# Patient Record
Sex: MALE | Race: WHITE | Employment: OTHER | URBAN - METROPOLITAN AREA
[De-identification: names, ages, dates, MRNs, and addresses within clinical notes are randomized per-mention and may not be internally consistent; named-entity substitution may affect disease eponyms.]

---

## 2018-01-04 ENCOUNTER — APPOINTMENT (OUTPATIENT)
Dept: ULTRASOUND IMAGING | Age: 78
DRG: 682 | End: 2018-01-04
Payer: MEDICARE

## 2018-01-04 ENCOUNTER — HOSPITAL ENCOUNTER (INPATIENT)
Age: 78
LOS: 1 days | Discharge: HOME | DRG: 682 | End: 2018-01-05
Attending: EMERGENCY MEDICINE | Admitting: INTERNAL MEDICINE
Payer: MEDICARE

## 2018-01-04 DIAGNOSIS — E87.6 HYPOKALEMIA: ICD-10-CM

## 2018-01-04 DIAGNOSIS — N17.9 AKI (ACUTE KIDNEY INJURY) (HCC): ICD-10-CM

## 2018-01-04 DIAGNOSIS — E86.0 DEHYDRATION: Primary | ICD-10-CM

## 2018-01-04 PROBLEM — J10.1 INFLUENZA A: Status: ACTIVE | Noted: 2018-01-04

## 2018-01-04 LAB
-: ABNORMAL
-: ABNORMAL
ABSOLUTE EOS #: 0 K/UL (ref 0–0.4)
ABSOLUTE EOS #: 0 K/UL (ref 0–0.4)
ABSOLUTE IMMATURE GRANULOCYTE: ABNORMAL K/UL (ref 0–0.3)
ABSOLUTE IMMATURE GRANULOCYTE: ABNORMAL K/UL (ref 0–0.3)
ABSOLUTE LYMPH #: 0.5 K/UL (ref 1–4.8)
ABSOLUTE LYMPH #: 0.7 K/UL (ref 1–4.8)
ABSOLUTE MONO #: 0.4 K/UL (ref 0.1–1.3)
ABSOLUTE MONO #: 0.4 K/UL (ref 0.1–1.3)
ALBUMIN SERPL-MCNC: 3.4 G/DL (ref 3.5–5.2)
ALBUMIN SERPL-MCNC: 4.3 G/DL (ref 3.5–5.2)
ALBUMIN/GLOBULIN RATIO: ABNORMAL (ref 1–2.5)
ALBUMIN/GLOBULIN RATIO: NORMAL (ref 1–2.5)
ALP BLD-CCNC: 54 U/L (ref 40–129)
ALP BLD-CCNC: 72 U/L (ref 40–129)
ALT SERPL-CCNC: 19 U/L (ref 5–41)
ALT SERPL-CCNC: 26 U/L (ref 5–41)
AMORPHOUS: ABNORMAL
AMORPHOUS: ABNORMAL
ANION GAP SERPL CALCULATED.3IONS-SCNC: 14 MMOL/L (ref 9–17)
ANION GAP SERPL CALCULATED.3IONS-SCNC: 19 MMOL/L (ref 9–17)
AST SERPL-CCNC: 31 U/L
AST SERPL-CCNC: 39 U/L
BACTERIA: ABNORMAL
BACTERIA: ABNORMAL
BASOPHILS # BLD: 1 % (ref 0–2)
BASOPHILS # BLD: 1 % (ref 0–2)
BASOPHILS ABSOLUTE: 0 K/UL (ref 0–0.2)
BASOPHILS ABSOLUTE: 0 K/UL (ref 0–0.2)
BILIRUB SERPL-MCNC: 0.38 MG/DL (ref 0.3–1.2)
BILIRUB SERPL-MCNC: 0.56 MG/DL (ref 0.3–1.2)
BILIRUBIN DIRECT: 0.14 MG/DL
BILIRUBIN URINE: NEGATIVE
BILIRUBIN URINE: NEGATIVE
BILIRUBIN, INDIRECT: 0.42 MG/DL (ref 0–1)
BUN BLDV-MCNC: 26 MG/DL (ref 8–23)
BUN BLDV-MCNC: 29 MG/DL (ref 8–23)
BUN/CREAT BLD: ABNORMAL (ref 9–20)
BUN/CREAT BLD: ABNORMAL (ref 9–20)
CALCIUM SERPL-MCNC: 8.1 MG/DL (ref 8.6–10.4)
CALCIUM SERPL-MCNC: 8.8 MG/DL (ref 8.6–10.4)
CASTS UA: ABNORMAL /LPF
CASTS UA: ABNORMAL /LPF
CHLORIDE BLD-SCNC: 104 MMOL/L (ref 98–107)
CHLORIDE BLD-SCNC: 97 MMOL/L (ref 98–107)
CO2: 23 MMOL/L (ref 20–31)
CO2: 23 MMOL/L (ref 20–31)
COLOR: YELLOW
COLOR: YELLOW
COMMENT UA: ABNORMAL
COMMENT UA: ABNORMAL
CREAT SERPL-MCNC: 1.6 MG/DL (ref 0.7–1.2)
CREAT SERPL-MCNC: 1.81 MG/DL (ref 0.7–1.2)
CRYSTALS, UA: ABNORMAL /HPF
CRYSTALS, UA: ABNORMAL /HPF
DIFFERENTIAL TYPE: ABNORMAL
DIFFERENTIAL TYPE: ABNORMAL
DIRECT EXAM: ABNORMAL
DIRECT EXAM: ABNORMAL
EOSINOPHILS RELATIVE PERCENT: 0 % (ref 0–4)
EOSINOPHILS RELATIVE PERCENT: 0 % (ref 0–4)
EPITHELIAL CELLS UA: ABNORMAL /HPF
EPITHELIAL CELLS UA: ABNORMAL /HPF
GFR AFRICAN AMERICAN: 44 ML/MIN
GFR AFRICAN AMERICAN: 51 ML/MIN
GFR NON-AFRICAN AMERICAN: 37 ML/MIN
GFR NON-AFRICAN AMERICAN: 42 ML/MIN
GFR SERPL CREATININE-BSD FRML MDRD: ABNORMAL ML/MIN/{1.73_M2}
GLOBULIN: NORMAL G/DL (ref 1.5–3.8)
GLUCOSE BLD-MCNC: 127 MG/DL (ref 75–110)
GLUCOSE BLD-MCNC: 134 MG/DL (ref 75–110)
GLUCOSE BLD-MCNC: 140 MG/DL (ref 70–99)
GLUCOSE BLD-MCNC: 160 MG/DL (ref 75–110)
GLUCOSE BLD-MCNC: 167 MG/DL (ref 75–110)
GLUCOSE BLD-MCNC: 236 MG/DL (ref 75–110)
GLUCOSE BLD-MCNC: 62 MG/DL (ref 75–110)
GLUCOSE BLD-MCNC: 66 MG/DL (ref 70–99)
GLUCOSE URINE: ABNORMAL
GLUCOSE URINE: NEGATIVE
HCT VFR BLD CALC: 37.8 % (ref 41–53)
HCT VFR BLD CALC: 44.3 % (ref 41–53)
HEMOGLOBIN: 12.7 G/DL (ref 13.5–17.5)
HEMOGLOBIN: 15 G/DL (ref 13.5–17.5)
IMMATURE GRANULOCYTES: ABNORMAL %
IMMATURE GRANULOCYTES: ABNORMAL %
INR BLD: 1.1
KETONES, URINE: NEGATIVE
KETONES, URINE: NEGATIVE
LEUKOCYTE ESTERASE, URINE: NEGATIVE
LEUKOCYTE ESTERASE, URINE: NEGATIVE
LIPASE: 70 U/L (ref 13–60)
LYMPHOCYTES # BLD: 11 % (ref 24–44)
LYMPHOCYTES # BLD: 23 % (ref 24–44)
Lab: ABNORMAL
MAGNESIUM: 1.6 MG/DL (ref 1.6–2.6)
MCH RBC QN AUTO: 30.8 PG (ref 26–34)
MCH RBC QN AUTO: 31 PG (ref 26–34)
MCHC RBC AUTO-ENTMCNC: 33.6 G/DL (ref 31–37)
MCHC RBC AUTO-ENTMCNC: 33.8 G/DL (ref 31–37)
MCV RBC AUTO: 91.5 FL (ref 80–100)
MCV RBC AUTO: 91.8 FL (ref 80–100)
MONOCYTES # BLD: 10 % (ref 1–7)
MONOCYTES # BLD: 11 % (ref 1–7)
MUCUS: ABNORMAL
MUCUS: ABNORMAL
NITRITE, URINE: NEGATIVE
NITRITE, URINE: NEGATIVE
OSMOLALITY URINE: 460 MOSM/KG (ref 80–1300)
OTHER OBSERVATIONS UA: ABNORMAL
OTHER OBSERVATIONS UA: ABNORMAL
PDW BLD-RTO: 12.8 % (ref 11.5–14.9)
PDW BLD-RTO: 12.8 % (ref 11.5–14.9)
PH UA: 5.5 (ref 5–8)
PH UA: 5.5 (ref 5–8)
PLATELET # BLD: 126 K/UL (ref 150–450)
PLATELET # BLD: 131 K/UL (ref 150–450)
PLATELET ESTIMATE: ABNORMAL
PLATELET ESTIMATE: ABNORMAL
PMV BLD AUTO: 8.1 FL (ref 6–12)
PMV BLD AUTO: 8.4 FL (ref 6–12)
POTASSIUM SERPL-SCNC: 3.3 MMOL/L (ref 3.7–5.3)
POTASSIUM SERPL-SCNC: 3.4 MMOL/L (ref 3.7–5.3)
PROTEIN UA: ABNORMAL
PROTEIN UA: NEGATIVE
PROTHROMBIN TIME: 11.4 SEC (ref 9.7–12)
RBC # BLD: 4.14 M/UL (ref 4.5–5.9)
RBC # BLD: 4.83 M/UL (ref 4.5–5.9)
RBC # BLD: ABNORMAL 10*6/UL
RBC # BLD: ABNORMAL 10*6/UL
RBC UA: ABNORMAL /HPF
RBC UA: ABNORMAL /HPF
RENAL EPITHELIAL, UA: ABNORMAL /HPF
RENAL EPITHELIAL, UA: ABNORMAL /HPF
SEG NEUTROPHILS: 65 % (ref 36–66)
SEG NEUTROPHILS: 78 % (ref 36–66)
SEGMENTED NEUTROPHILS ABSOLUTE COUNT: 2.1 K/UL (ref 1.3–9.1)
SEGMENTED NEUTROPHILS ABSOLUTE COUNT: 3.3 K/UL (ref 1.3–9.1)
SERUM OSMOLALITY: 293 MOSM/KG (ref 275–295)
SODIUM BLD-SCNC: 139 MMOL/L (ref 135–144)
SODIUM BLD-SCNC: 141 MMOL/L (ref 135–144)
SODIUM,UR: 95 MMOL/L
SPECIFIC GRAVITY UA: 1.01 (ref 1–1.03)
SPECIFIC GRAVITY UA: 1.02 (ref 1–1.03)
SPECIMEN DESCRIPTION: ABNORMAL
SPECIMEN DESCRIPTION: ABNORMAL
STATUS: ABNORMAL
TOTAL PROTEIN, URINE: 13 MG/DL
TOTAL PROTEIN: 6.1 G/DL (ref 6.4–8.3)
TOTAL PROTEIN: 7.9 G/DL (ref 6.4–8.3)
TRICHOMONAS: ABNORMAL
TRICHOMONAS: ABNORMAL
TURBIDITY: CLEAR
TURBIDITY: CLEAR
URINE HGB: ABNORMAL
URINE HGB: NEGATIVE
UROBILINOGEN, URINE: NORMAL
UROBILINOGEN, URINE: NORMAL
WBC # BLD: 3.2 K/UL (ref 3.5–11)
WBC # BLD: 4.3 K/UL (ref 3.5–11)
WBC # BLD: ABNORMAL 10*3/UL
WBC # BLD: ABNORMAL 10*3/UL
WBC UA: ABNORMAL /HPF
WBC UA: ABNORMAL /HPF
YEAST: ABNORMAL
YEAST: ABNORMAL

## 2018-01-04 PROCEDURE — 80076 HEPATIC FUNCTION PANEL: CPT

## 2018-01-04 PROCEDURE — 6370000000 HC RX 637 (ALT 250 FOR IP): Performed by: NURSE PRACTITIONER

## 2018-01-04 PROCEDURE — 2580000003 HC RX 258: Performed by: NURSE PRACTITIONER

## 2018-01-04 PROCEDURE — 81001 URINALYSIS AUTO W/SCOPE: CPT

## 2018-01-04 PROCEDURE — 83690 ASSAY OF LIPASE: CPT

## 2018-01-04 PROCEDURE — 1200000000 HC SEMI PRIVATE

## 2018-01-04 PROCEDURE — 85025 COMPLETE CBC W/AUTO DIFF WBC: CPT

## 2018-01-04 PROCEDURE — 87804 INFLUENZA ASSAY W/OPTIC: CPT

## 2018-01-04 PROCEDURE — 76770 US EXAM ABDO BACK WALL COMP: CPT

## 2018-01-04 PROCEDURE — 36415 COLL VENOUS BLD VENIPUNCTURE: CPT

## 2018-01-04 PROCEDURE — 80053 COMPREHEN METABOLIC PANEL: CPT

## 2018-01-04 PROCEDURE — 82947 ASSAY GLUCOSE BLOOD QUANT: CPT

## 2018-01-04 PROCEDURE — 83935 ASSAY OF URINE OSMOLALITY: CPT

## 2018-01-04 PROCEDURE — 85610 PROTHROMBIN TIME: CPT

## 2018-01-04 PROCEDURE — 99285 EMERGENCY DEPT VISIT HI MDM: CPT

## 2018-01-04 PROCEDURE — 83735 ASSAY OF MAGNESIUM: CPT

## 2018-01-04 PROCEDURE — 84300 ASSAY OF URINE SODIUM: CPT

## 2018-01-04 PROCEDURE — 99223 1ST HOSP IP/OBS HIGH 75: CPT | Performed by: INTERNAL MEDICINE

## 2018-01-04 PROCEDURE — 84156 ASSAY OF PROTEIN URINE: CPT

## 2018-01-04 PROCEDURE — 83930 ASSAY OF BLOOD OSMOLALITY: CPT

## 2018-01-04 PROCEDURE — 80048 BASIC METABOLIC PNL TOTAL CA: CPT

## 2018-01-04 PROCEDURE — 6370000000 HC RX 637 (ALT 250 FOR IP): Performed by: INTERNAL MEDICINE

## 2018-01-04 RX ORDER — METOPROLOL SUCCINATE 50 MG/1
50 TABLET, EXTENDED RELEASE ORAL DAILY
Status: DISCONTINUED | OUTPATIENT
Start: 2018-01-04 | End: 2018-01-05 | Stop reason: HOSPADM

## 2018-01-04 RX ORDER — GLIMEPIRIDE 4 MG/1
4 TABLET ORAL 2 TIMES DAILY
COMMUNITY

## 2018-01-04 RX ORDER — OSELTAMIVIR PHOSPHATE 6 MG/ML
75 FOR SUSPENSION ORAL 2 TIMES DAILY
Status: DISCONTINUED | OUTPATIENT
Start: 2018-01-04 | End: 2018-01-04

## 2018-01-04 RX ORDER — TRAMADOL HYDROCHLORIDE 50 MG/1
100 TABLET ORAL EVERY 6 HOURS PRN
Status: DISCONTINUED | OUTPATIENT
Start: 2018-01-04 | End: 2018-01-05 | Stop reason: HOSPADM

## 2018-01-04 RX ORDER — ASPIRIN 81 MG/1
81 TABLET, CHEWABLE ORAL DAILY
COMMUNITY

## 2018-01-04 RX ORDER — OSELTAMIVIR PHOSPHATE 6 MG/ML
30 FOR SUSPENSION ORAL 2 TIMES DAILY
Status: DISCONTINUED | OUTPATIENT
Start: 2018-01-04 | End: 2018-01-05 | Stop reason: HOSPADM

## 2018-01-04 RX ORDER — DEXTROSE MONOHYDRATE 25 G/50ML
12.5 INJECTION, SOLUTION INTRAVENOUS PRN
Status: DISCONTINUED | OUTPATIENT
Start: 2018-01-04 | End: 2018-01-05 | Stop reason: HOSPADM

## 2018-01-04 RX ORDER — OSELTAMIVIR PHOSPHATE 75 MG/1
75 CAPSULE ORAL 2 TIMES DAILY
Status: DISCONTINUED | OUTPATIENT
Start: 2018-01-04 | End: 2018-01-04 | Stop reason: DRUGHIGH

## 2018-01-04 RX ORDER — ATORVASTATIN CALCIUM 80 MG/1
80 TABLET, FILM COATED ORAL DAILY
COMMUNITY

## 2018-01-04 RX ORDER — POTASSIUM CHLORIDE 7.45 MG/ML
10 INJECTION INTRAVENOUS PRN
Status: DISCONTINUED | OUTPATIENT
Start: 2018-01-04 | End: 2018-01-05 | Stop reason: HOSPADM

## 2018-01-04 RX ORDER — ATORVASTATIN CALCIUM 80 MG/1
80 TABLET, FILM COATED ORAL DAILY
Status: DISCONTINUED | OUTPATIENT
Start: 2018-01-04 | End: 2018-01-05 | Stop reason: HOSPADM

## 2018-01-04 RX ORDER — LOSARTAN POTASSIUM 100 MG/1
100 TABLET ORAL DAILY
COMMUNITY

## 2018-01-04 RX ORDER — SODIUM CHLORIDE 9 MG/ML
INJECTION, SOLUTION INTRAVENOUS CONTINUOUS
Status: DISCONTINUED | OUTPATIENT
Start: 2018-01-04 | End: 2018-01-05 | Stop reason: HOSPADM

## 2018-01-04 RX ORDER — DOCUSATE SODIUM 100 MG/1
100 CAPSULE, LIQUID FILLED ORAL 2 TIMES DAILY
Status: DISCONTINUED | OUTPATIENT
Start: 2018-01-04 | End: 2018-01-05 | Stop reason: HOSPADM

## 2018-01-04 RX ORDER — NICOTINE 21 MG/24HR
1 PATCH, TRANSDERMAL 24 HOURS TRANSDERMAL DAILY PRN
Status: DISCONTINUED | OUTPATIENT
Start: 2018-01-04 | End: 2018-01-05 | Stop reason: HOSPADM

## 2018-01-04 RX ORDER — HYDROCHLOROTHIAZIDE 12.5 MG/1
12.5 CAPSULE, GELATIN COATED ORAL DAILY
COMMUNITY

## 2018-01-04 RX ORDER — POTASSIUM CHLORIDE 20 MEQ/1
40 TABLET, EXTENDED RELEASE ORAL PRN
Status: DISCONTINUED | OUTPATIENT
Start: 2018-01-04 | End: 2018-01-05 | Stop reason: HOSPADM

## 2018-01-04 RX ORDER — SODIUM CHLORIDE 0.9 % (FLUSH) 0.9 %
10 SYRINGE (ML) INJECTION PRN
Status: DISCONTINUED | OUTPATIENT
Start: 2018-01-04 | End: 2018-01-05 | Stop reason: HOSPADM

## 2018-01-04 RX ORDER — DEXTROSE MONOHYDRATE 50 MG/ML
100 INJECTION, SOLUTION INTRAVENOUS PRN
Status: DISCONTINUED | OUTPATIENT
Start: 2018-01-04 | End: 2018-01-05 | Stop reason: HOSPADM

## 2018-01-04 RX ORDER — METOPROLOL SUCCINATE 50 MG/1
50 TABLET, EXTENDED RELEASE ORAL DAILY
COMMUNITY

## 2018-01-04 RX ORDER — SODIUM CHLORIDE 0.9 % (FLUSH) 0.9 %
10 SYRINGE (ML) INJECTION EVERY 12 HOURS SCHEDULED
Status: DISCONTINUED | OUTPATIENT
Start: 2018-01-04 | End: 2018-01-05 | Stop reason: HOSPADM

## 2018-01-04 RX ORDER — ALOGLIPTIN BENZOATE AND PIOGLITAZONE HYDROCHLORIDE 25; 30 MG/1; MG/1
1 TABLET, FILM COATED ORAL DAILY
COMMUNITY

## 2018-01-04 RX ORDER — ACETAMINOPHEN 325 MG/1
650 TABLET ORAL EVERY 4 HOURS PRN
Status: DISCONTINUED | OUTPATIENT
Start: 2018-01-04 | End: 2018-01-05 | Stop reason: HOSPADM

## 2018-01-04 RX ORDER — ONDANSETRON 2 MG/ML
4 INJECTION INTRAMUSCULAR; INTRAVENOUS EVERY 6 HOURS PRN
Status: DISCONTINUED | OUTPATIENT
Start: 2018-01-04 | End: 2018-01-05 | Stop reason: HOSPADM

## 2018-01-04 RX ORDER — POTASSIUM CHLORIDE 20MEQ/15ML
40 LIQUID (ML) ORAL PRN
Status: DISCONTINUED | OUTPATIENT
Start: 2018-01-04 | End: 2018-01-05 | Stop reason: HOSPADM

## 2018-01-04 RX ORDER — TRAMADOL HYDROCHLORIDE 50 MG/1
50 TABLET ORAL EVERY 6 HOURS PRN
Status: DISCONTINUED | OUTPATIENT
Start: 2018-01-04 | End: 2018-01-05 | Stop reason: HOSPADM

## 2018-01-04 RX ORDER — DILTIAZEM HYDROCHLORIDE 180 MG/1
180 CAPSULE, EXTENDED RELEASE ORAL DAILY
COMMUNITY

## 2018-01-04 RX ORDER — DILTIAZEM HYDROCHLORIDE 180 MG/1
180 CAPSULE, COATED, EXTENDED RELEASE ORAL DAILY
Status: DISCONTINUED | OUTPATIENT
Start: 2018-01-04 | End: 2018-01-05 | Stop reason: HOSPADM

## 2018-01-04 RX ORDER — NICOTINE POLACRILEX 4 MG
15 LOZENGE BUCCAL PRN
Status: DISCONTINUED | OUTPATIENT
Start: 2018-01-04 | End: 2018-01-05 | Stop reason: HOSPADM

## 2018-01-04 RX ORDER — ASPIRIN 81 MG/1
81 TABLET, CHEWABLE ORAL DAILY
Status: DISCONTINUED | OUTPATIENT
Start: 2018-01-04 | End: 2018-01-05 | Stop reason: HOSPADM

## 2018-01-04 RX ADMIN — Medication 10 ML: at 09:47

## 2018-01-04 RX ADMIN — APIXABAN 5 MG: 5 TABLET, FILM COATED ORAL at 09:48

## 2018-01-04 RX ADMIN — POTASSIUM CHLORIDE 40 MEQ: 20 TABLET, EXTENDED RELEASE ORAL at 09:48

## 2018-01-04 RX ADMIN — ATORVASTATIN CALCIUM 80 MG: 80 TABLET, FILM COATED ORAL at 09:47

## 2018-01-04 RX ADMIN — SODIUM CHLORIDE: 9 INJECTION, SOLUTION INTRAVENOUS at 03:49

## 2018-01-04 RX ADMIN — OSELTAMIVIR PHOSPHATE 30 MG: 6 POWDER, FOR SUSPENSION ORAL at 14:36

## 2018-01-04 RX ADMIN — METOPROLOL SUCCINATE 50 MG: 50 TABLET, EXTENDED RELEASE ORAL at 09:48

## 2018-01-04 RX ADMIN — OSELTAMIVIR PHOSPHATE 30 MG: 6 POWDER, FOR SUSPENSION ORAL at 22:51

## 2018-01-04 RX ADMIN — APIXABAN 5 MG: 5 TABLET, FILM COATED ORAL at 22:33

## 2018-01-04 RX ADMIN — DILTIAZEM HYDROCHLORIDE 180 MG: 180 CAPSULE, COATED, EXTENDED RELEASE ORAL at 09:48

## 2018-01-04 RX ADMIN — ASPIRIN 81 MG 81 MG: 81 TABLET ORAL at 09:47

## 2018-01-04 RX ADMIN — ACETAMINOPHEN 650 MG: 325 TABLET, FILM COATED ORAL at 06:25

## 2018-01-04 RX ADMIN — DOCUSATE SODIUM 100 MG: 100 CAPSULE, LIQUID FILLED ORAL at 09:48

## 2018-01-04 ASSESSMENT — ENCOUNTER SYMPTOMS
EYE DISCHARGE: 0
EYE REDNESS: 0
COLOR CHANGE: 0
SHORTNESS OF BREATH: 0
RHINORRHEA: 0
COUGH: 0
SORE THROAT: 0
NAUSEA: 1
VOMITING: 0
DIARRHEA: 0

## 2018-01-04 ASSESSMENT — PAIN SCALES - GENERAL
PAINLEVEL_OUTOF10: 0

## 2018-01-04 NOTE — H&P
[address] [facility].    HISTORY AND PHYSICAL EXAMINATION            Date:   1/4/2018  Patient name:  Paco Harrison  Date of admission:  1/4/2018 12:13 AM  MRN:   [id number]  YOB: 1940    CHIEF COMPLAINT:   Unwell  History Obtained From:  Patient and chart review. HISTORY OF PRESENT ILLNESS:      The patient is a 69 y.o.  male who is admitted to the hospital for  Patient admitted with a few day history of cough and runny nose fever and body aches not taking oral diet admitted with a very low sugar of 24 acute renal failure and influenza A positive    Past Medical History:   has a past medical history of Diabetes mellitus ([facility] [address] Hyperlipidemia; Hypertension; and Pacemaker. Past Surgical History:   has a past surgical history that includes Pacemaker insertion (02/2017); Cardiac catheterization; and Cataract removal.     Home Medications:    Prior to Admission medications    Medication Sig Start Date End Date Taking?  Authorizing Provider   aspirin 81 MG chewable tablet Take 81 mg by mouth daily   Yes Historical Provider, MD   losartan (COZAAR) 100 MG tablet Take 100 mg by mouth daily   Yes Historical Provider, MD   metFORMIN (GLUCOPHAGE) 1000 MG tablet Take 1,000 mg by mouth 2 times daily (with meals)   Yes Historical Provider, MD   glimepiride (AMARYL) 4 MG tablet Take 4 mg by mouth 2 times daily   Yes Historical Provider, MD   diltiazem (DILACOR XR) 180 MG extended release capsule Take 180 mg by mouth daily   Yes Historical Provider, MD   metoprolol succinate (TOPROL XL) 50 MG extended release tablet Take 50 mg by mouth daily   Yes Historical Provider, MD   atorvastatin (LIPITOR) 80 MG tablet Take 80 mg by mouth daily   Yes Historical Provider, MD   alogliptin-pioglitazone (OSENI) 25-30 MG TABS Take 1 tablet by mouth daily   Yes Historical Provider, MD   apixaban (ELIQUIS) 5 MG TABS tablet Take by mouth 2 times daily   Yes Imaging/Diagonstics:      CXR: No acute cardiopulmonary findings. ASSESSMENT:    Patient Active Problem List   Diagnosis    Acute kidney injury ([facility] [address]    Hypertension    Hyperlipidemia    Diabetes mellitus ([facility] [address]    Influenza A       PLAN:  Influenza pneumonia  Acute renal failure  Hypoglycemia to 24  Discontinue metformin and glimepiride  IV fluids for acute renal failure creatinine is 1.8  Start Tamiflu and oral antibiotics for secondary bacterial infection      MD PRAVEEN Hernandez [address], [address] [place] [address].    Phone [phone]   Fax: [phone]  Answering Service: [phone]

## 2018-01-04 NOTE — ED NOTES
Pt presents in ED c/o hypoglycemia and N/V x2 days. Pt was hard to arouse when family called EMS. EMS took blood glucose and it was 27. [address] gave an amp of D50 and blood glucose increased to 187. Upon arrival to ED blood glucose was 136. Pt reported he has been able to keep down gatorade and bananas. Pt reported diarrhea x1 day. Pt is eupneic, A&OX4, and PWD. Call light in reach.           Helayne Collet, RN  01/04/18 2097

## 2018-01-04 NOTE — ED PROVIDER NOTES
AM  The patient is from out of town, he has no knowledge of any kidney dysfunction. At this time we will discuss with inpatient providers for reevaluation, likely related to dehydration. Sangeeta Arzate CRITICAL CARE:      CONSULTS:  None      PROCEDURES:      FINAL IMPRESSION      1. Dehydration    2. Hypokalemia    3. CHRISTIN (acute kidney injury) [facility])          DISPOSITION/PLAN   DISPOSITION Decision To Admit 01/04/2018 01:01:26 AM          PATIENT REFERRED TO:  No follow-up provider specified.     DISCHARGE MEDICATIONS:  New Prescriptions    No medications on file       (Please note that portions of this note were completed with a voice recognition program.  Efforts were made to edit the dictations but occasionally words are mis-transcribed.)    Dior Ferguson  Emergency Medicine                 Dior Ferguson MD  01/04/18 5252

## 2018-01-04 NOTE — PROGRESS NOTES
ADMISSION NOTE       Patient admitted to room  2049. Time of admit:  0300    Admit from:  ER    Reason for admission:  Acute Kidney Injury    Where patient has been residing for the last 24 hrs:  Niece house visiting from [place]. Has the patient been admitted to any facility in the last 4 weeks, which one:  no    Family at bedside:  yes    Patient is currently in pain no  Patient has been oriented to room, educated on how to use call light, and to call for assistance prior to getting up. Bed in lowest and locked position. 2 siderails up for safety. Call light within reach. Patient in no distress. [facility]  DVT Prophylaxis and Vaccine Status  Work List  Mandatory for all patients      Patient must be on both Chemical prophylaxis and Mechanical prophylaxis.  If chemical/mechanical prophylaxis is not ordered, the physician must document a reason for not using prophylaxis     Chemical Prophylaxis  Is patient on chemical prophylaxis: Yes  If no chemical prophylaxis Is a order in for No Chemical VTE prophylaxis not applicable  If no was the physician notified not applicable      Mechanical Prophylaxis  Is patient on mechanical prophylaxis, intermittent pneumatic compression device: Yes  If no was the physician notified not applicable        Pneumonia Vaccine  Vaccine indicated:  Vaccination indicated, Patient was educated on the benefits of the vaccination and Vaccination refused  If indicated was the vaccine given: no- patient refused    Influenza Vaccine (applicable from October through March):  Vaccine indicated: Vaccination indicated, Patient was educated on the benefits of the vaccination and Vaccination refused  If indicated was the vaccine given: no- patient refused    Patient Education  Education completed on DVT prophylaxis: yes

## 2018-01-04 NOTE — ED NOTES
Provided pt with meal tray with physician's approval. Pt eats independently. Pt tolerated well.         Sarah Milligna RN  01/04/18

## 2018-01-05 VITALS
BODY MASS INDEX: 32.82 KG/M2 | SYSTOLIC BLOOD PRESSURE: 136 MMHG | TEMPERATURE: 98.1 F | OXYGEN SATURATION: 95 % | HEIGHT: 74 IN | HEART RATE: 62 BPM | RESPIRATION RATE: 18 BRPM | DIASTOLIC BLOOD PRESSURE: 65 MMHG | WEIGHT: 255.73 LBS

## 2018-01-05 LAB
ABSOLUTE EOS #: 0 K/UL (ref 0–0.4)
ABSOLUTE IMMATURE GRANULOCYTE: ABNORMAL K/UL (ref 0–0.3)
ABSOLUTE LYMPH #: 1.08 K/UL (ref 1–4.8)
ABSOLUTE MONO #: 0.18 K/UL (ref 0.1–1.3)
ALBUMIN SERPL-MCNC: 3 G/DL (ref 3.5–5.2)
ALBUMIN/GLOBULIN RATIO: ABNORMAL (ref 1–2.5)
ALP BLD-CCNC: 55 U/L (ref 40–129)
ALT SERPL-CCNC: 18 U/L (ref 5–41)
ANION GAP SERPL CALCULATED.3IONS-SCNC: 14 MMOL/L (ref 9–17)
AST SERPL-CCNC: 32 U/L
BASOPHILS # BLD: 0 % (ref 0–2)
BASOPHILS ABSOLUTE: 0 K/UL (ref 0–0.2)
BILIRUB SERPL-MCNC: 0.42 MG/DL (ref 0.3–1.2)
BUN BLDV-MCNC: 23 MG/DL (ref 8–23)
BUN/CREAT BLD: ABNORMAL (ref 9–20)
CALCIUM SERPL-MCNC: 8 MG/DL (ref 8.6–10.4)
CHLORIDE BLD-SCNC: 104 MMOL/L (ref 98–107)
CO2: 20 MMOL/L (ref 20–31)
CREAT SERPL-MCNC: 1.3 MG/DL (ref 0.7–1.2)
DIFFERENTIAL TYPE: ABNORMAL
EOSINOPHILS RELATIVE PERCENT: 0 % (ref 0–4)
GFR AFRICAN AMERICAN: >60 ML/MIN
GFR NON-AFRICAN AMERICAN: 54 ML/MIN
GFR SERPL CREATININE-BSD FRML MDRD: ABNORMAL ML/MIN/{1.73_M2}
GFR SERPL CREATININE-BSD FRML MDRD: ABNORMAL ML/MIN/{1.73_M2}
GLUCOSE BLD-MCNC: 112 MG/DL (ref 75–110)
GLUCOSE BLD-MCNC: 130 MG/DL (ref 70–99)
GLUCOSE BLD-MCNC: 271 MG/DL (ref 75–110)
HCT VFR BLD CALC: 36.1 % (ref 41–53)
HEMOGLOBIN: 11.9 G/DL (ref 13.5–17.5)
IMMATURE GRANULOCYTES: ABNORMAL %
LYMPHOCYTES # BLD: 36 % (ref 24–44)
MCH RBC QN AUTO: 30.5 PG (ref 26–34)
MCHC RBC AUTO-ENTMCNC: 33.1 G/DL (ref 31–37)
MCV RBC AUTO: 92.1 FL (ref 80–100)
MONOCYTES # BLD: 6 % (ref 1–7)
MORPHOLOGY: NORMAL
PDW BLD-RTO: 13.2 % (ref 11.5–14.9)
PLATELET # BLD: 116 K/UL (ref 150–450)
PLATELET ESTIMATE: ABNORMAL
PMV BLD AUTO: 8.3 FL (ref 6–12)
POTASSIUM SERPL-SCNC: 3.6 MMOL/L (ref 3.7–5.3)
RBC # BLD: 3.92 M/UL (ref 4.5–5.9)
RBC # BLD: ABNORMAL 10*6/UL
SEG NEUTROPHILS: 58 % (ref 36–66)
SEGMENTED NEUTROPHILS ABSOLUTE COUNT: 1.74 K/UL (ref 1.3–9.1)
SODIUM BLD-SCNC: 138 MMOL/L (ref 135–144)
TOTAL PROTEIN: 5.9 G/DL (ref 6.4–8.3)
WBC # BLD: 3 K/UL (ref 3.5–11)
WBC # BLD: ABNORMAL 10*3/UL

## 2018-01-05 PROCEDURE — 6370000000 HC RX 637 (ALT 250 FOR IP): Performed by: INTERNAL MEDICINE

## 2018-01-05 PROCEDURE — 6370000000 HC RX 637 (ALT 250 FOR IP): Performed by: NURSE PRACTITIONER

## 2018-01-05 PROCEDURE — 2580000003 HC RX 258: Performed by: NURSE PRACTITIONER

## 2018-01-05 PROCEDURE — 80053 COMPREHEN METABOLIC PANEL: CPT

## 2018-01-05 PROCEDURE — 85025 COMPLETE CBC W/AUTO DIFF WBC: CPT

## 2018-01-05 PROCEDURE — 36415 COLL VENOUS BLD VENIPUNCTURE: CPT

## 2018-01-05 PROCEDURE — 82947 ASSAY GLUCOSE BLOOD QUANT: CPT

## 2018-01-05 PROCEDURE — 99239 HOSP IP/OBS DSCHRG MGMT >30: CPT | Performed by: INTERNAL MEDICINE

## 2018-01-05 RX ORDER — OSELTAMIVIR PHOSPHATE 75 MG/1
75 CAPSULE ORAL 2 TIMES DAILY
Qty: 20 CAPSULE | Refills: 0 | Status: SHIPPED | OUTPATIENT
Start: 2018-01-05 | End: 2018-01-15

## 2018-01-05 RX ADMIN — ASPIRIN 81 MG 81 MG: 81 TABLET ORAL at 08:46

## 2018-01-05 RX ADMIN — Medication 10 ML: at 08:47

## 2018-01-05 RX ADMIN — METOPROLOL SUCCINATE 50 MG: 50 TABLET, EXTENDED RELEASE ORAL at 08:45

## 2018-01-05 RX ADMIN — APIXABAN 5 MG: 5 TABLET, FILM COATED ORAL at 08:46

## 2018-01-05 RX ADMIN — OSELTAMIVIR PHOSPHATE 30 MG: 6 POWDER, FOR SUSPENSION ORAL at 10:27

## 2018-01-05 RX ADMIN — ATORVASTATIN CALCIUM 80 MG: 80 TABLET, FILM COATED ORAL at 08:46

## 2018-01-05 RX ADMIN — DILTIAZEM HYDROCHLORIDE 180 MG: 180 CAPSULE, COATED, EXTENDED RELEASE ORAL at 08:46

## 2018-01-05 NOTE — DISCHARGE SUMMARY
[facility]    Patient name:  Charlie Hayes  YOB: 1940  Primary Care Physician: Bill Peña MD    Date of admission:  1/4/2018 12:13 AM  Date of discharge: 1/5/2018    DISCHARGE DIAGNOSES       Principal Problem:    Acute kidney injury ([facility] [address]  Active Problems:    Hypertension    Hyperlipidemia    Diabetes mellitus ([facility] [address]    Influenza A      HOSPITAL COURSE      Influenza pneumonia  Acute renal failure  Hypoglycemia to 24   metformin and glimepiride held fro above    Scar resolved from 1.8 to baseline  Start Tamiflu and oral antibiotics for secondary bacterial infection  Consultants:  -none    Procedures:  None    DISCHARGE MEDICATIONS        Staci Lawson   Home Medication Instructions JAYLIN:[id number]    Printed on:01/05/18 9017   Medication Information                      alogliptin-pioglitazone (OSENI) 25-30 MG TABS  Take 1 tablet by mouth daily             apixaban (ELIQUIS) 5 MG TABS tablet  Take by mouth 2 times daily             aspirin 81 MG chewable tablet  Take 81 mg by mouth daily             atorvastatin (LIPITOR) 80 MG tablet  Take 80 mg by mouth daily             diltiazem (DILACOR XR) 180 MG extended release capsule  Take 180 mg by mouth daily             glimepiride (AMARYL) 4 MG tablet  Take 4 mg by mouth 2 times daily             hydrochlorothiazide (MICROZIDE) 12.5 MG capsule  Take 12.5 mg by mouth daily             losartan (COZAAR) 100 MG tablet  Take 100 mg by mouth daily             metFORMIN (GLUCOPHAGE) 1000 MG tablet  Take 1,000 mg by mouth 2 times daily (with meals)             metoprolol succinate (TOPROL XL) 50 MG extended release tablet  Take 50 mg by mouth daily             oseltamivir (TAMIFLU) 75 MG capsule  Take 1 capsule by mouth 2 times daily for 10 days                 DISPOSITION AND FOLLOW-UP     Disposition: home      Condition: Stable     Diet:  Regular diet     Activity: As

## 2018-01-05 NOTE — PROGRESS NOTES
Dm 2   with hypoglycemia due to taking glimepride and not eating much  No coma  Treated with iv dextrose  And holdin gmeds  Boo S Angie  1/5/2018

## 2018-01-05 NOTE — PLAN OF CARE
Problem: Falls - Risk of  Goal: Absence of falls  Outcome: Ongoing      Problem: Nausea/Vomiting:  Goal: Absence of nausea/vomiting  Absence of nausea/vomiting   Outcome: Ongoing

## 2018-04-11 PROBLEM — J10.1 INFLUENZA A: Status: RESOLVED | Noted: 2018-01-04 | Resolved: 2018-04-11
